# Patient Record
Sex: MALE | Race: WHITE | NOT HISPANIC OR LATINO | URBAN - METROPOLITAN AREA
[De-identification: names, ages, dates, MRNs, and addresses within clinical notes are randomized per-mention and may not be internally consistent; named-entity substitution may affect disease eponyms.]

---

## 2017-08-08 ENCOUNTER — RECORDS - HEALTHEAST (OUTPATIENT)
Dept: LAB | Facility: CLINIC | Age: 57
End: 2017-08-08

## 2017-08-08 LAB
CHOLEST SERPL-MCNC: 193 MG/DL
FASTING STATUS PATIENT QL REPORTED: NO
HDLC SERPL-MCNC: 82 MG/DL
LDLC SERPL CALC-MCNC: 101 MG/DL
PSA SERPL-MCNC: 0.5 NG/ML (ref 0–3.5)
TRIGL SERPL-MCNC: 50 MG/DL

## 2017-11-28 ENCOUNTER — OFFICE VISIT - HEALTHEAST (OUTPATIENT)
Dept: ALLERGY | Facility: CLINIC | Age: 57
End: 2017-11-28

## 2017-11-28 DIAGNOSIS — J30.9 CHRONIC ALLERGIC RHINITIS, UNSPECIFIED SEASONALITY, UNSPECIFIED TRIGGER: ICD-10-CM

## 2017-11-28 ASSESSMENT — MIFFLIN-ST. JEOR: SCORE: 1494.85

## 2018-03-26 ENCOUNTER — COMMUNICATION - HEALTHEAST (OUTPATIENT)
Dept: ALLERGY | Facility: CLINIC | Age: 58
End: 2018-03-26

## 2018-03-26 DIAGNOSIS — J30.9 CHRONIC ALLERGIC RHINITIS, UNSPECIFIED SEASONALITY, UNSPECIFIED TRIGGER: ICD-10-CM

## 2018-07-22 ENCOUNTER — OFFICE VISIT (OUTPATIENT)
Dept: URGENT CARE | Facility: URGENT CARE | Age: 58
End: 2018-07-22
Payer: COMMERCIAL

## 2018-07-22 VITALS
DIASTOLIC BLOOD PRESSURE: 70 MMHG | HEART RATE: 55 BPM | OXYGEN SATURATION: 97 % | TEMPERATURE: 97 F | SYSTOLIC BLOOD PRESSURE: 130 MMHG | WEIGHT: 155 LBS

## 2018-07-22 DIAGNOSIS — S61.011A LACERATION OF RIGHT THUMB WITHOUT FOREIGN BODY WITHOUT DAMAGE TO NAIL, INITIAL ENCOUNTER: Primary | ICD-10-CM

## 2018-07-22 PROCEDURE — 99203 OFFICE O/P NEW LOW 30 MIN: CPT | Performed by: FAMILY MEDICINE

## 2018-07-22 NOTE — PROGRESS NOTES
SUBJECTIVE:     Chief Complaint   Patient presents with     Urgent Care     Laceration     Pt states cut right thumb with hedge clippers today.      Rambo Dukes is a 58 year old right-handed male who presents to the clinic with a laceration on the right thumb (proximal phalanx area) sustained at around 4:30 pm today  This is a non-work related and accidental injury.    Mechanism of injury: A hedge clipper accidentally slipped and cut the right thumb.  Patient's wife is a nurse, and she applied direct pressure onto the wound.  There is still some oozing of blood from the wound.  No problems moving the thumb.  No numbness at the right thumb.     Associated symptoms: Denies numbness, weakness, or loss of function    EXAM:   The patient appears today in alert,no apparent distress distress  VITALS: /70 (BP Location: Left arm, Patient Position: Chair, Cuff Size: Adult Regular)  Pulse 55  Temp 97  F (36.1  C) (Tympanic)  Wt 155 lb (70.3 kg)  SpO2 97%    Size of laceration: 2 centimeters  Characteristics of the laceration: bleeding- mild, flap type and superficial without any dehiscence of the wound edges.    Tendon function intact: yes  Sensation to light touch intact: yes  Pulses intact: not asked  Picture included in patient's chart: no    Assessment:  Superficial Right thumb laceration.  No dehiscence.  As a result, no suture repair will be needed.      PLAN:  Gel foam was applied to the wound and the thumb was wrapped with Coban for 20 minutes.  There was no more bleeding. Patient was discharged.        Gerber Garcia MD

## 2018-07-22 NOTE — MR AVS SNAPSHOT
After Visit Summary   7/22/2018    Rambo Dukes    MRN: [id number]           Patient Information     Date Of Birth          1960        Visit Information        Provider Department      7/22/2018 5:20 PM Gerber Garcia MD [facility]        Today's Diagnoses     Laceration of right thumb without foreign body without damage to nail, initial encounter    -  1       Follow-ups after your visit        Who to contact     If you have questions or need follow up information about today's clinic visit or your schedule please contact [facility] directly at [phone].  Normal or non-critical lab and imaging results will be communicated to you by MyChart, letter or phone within 4 business days after the clinic has received the results. If you do not hear from us within 7 days, please contact the clinic through MyChart or phone. If you have a critical or abnormal lab result, we will notify you by phone as soon as possible.  Submit refill requests through [employer] or call your pharmacy and they will forward the refill request to us. Please allow 3 business days for your refill to be completed.          Additional Information About Your Visit        Care EveryWhere ID     This is your Care EveryWhere ID. This could be used by other organizations to access your [facility] medical records  [id number]        Your Vitals Were     Pulse Temperature Pulse Oximetry             55 97  F (36.1  C) (Tympanic) 97%          Blood Pressure from Last 3 Encounters:   07/22/18 130/70    Weight from Last 3 Encounters:   07/22/18 155 lb (70.3 kg)              Today, you had the following     No orders found for display       Primary Care Provider Fax #    Provider Not In System [phone]                Equal Access to Services     SLICK GARSIA : Mac Morrow, roberto luravinderadaha, qaybta kaaledgar chacko . So [facility]  [phone].    ATENCIÓN: Si habla mildred, tiene a johnson disposición servicios gratuitos de asistencia lingüística. Ramin al [phone].    We comply with applicable federal civil rights laws and [place] laws. We do not discriminate on the basis of race, color, national origin, age, disability, sex, sexual orientation, or gender identity.            Thank you!     Thank you for choosing [facility]  for your care. Our goal is always to provide you with excellent care. Hearing back from our patients is one way we can continue to improve our services. Please take a few minutes to complete the written survey that you may receive in the mail after your visit with us. Thank you!             Your Updated Medication List - Protect others around you: Learn how to safely use, store and throw away your medicines at www.disposemymeds.org.          This list is accurate as of 7/22/18  9:32 PM.  Always use your most recent med list.                   Brand Name Dispense Instructions for use Diagnosis    GLUCOSAMINE SULFATE PO

## 2018-08-18 ENCOUNTER — COMMUNICATION - HEALTHEAST (OUTPATIENT)
Dept: ALLERGY | Facility: CLINIC | Age: 58
End: 2018-08-18

## 2018-08-18 DIAGNOSIS — J30.9 CHRONIC ALLERGIC RHINITIS, UNSPECIFIED SEASONALITY, UNSPECIFIED TRIGGER: ICD-10-CM

## 2018-11-19 ENCOUNTER — COMMUNICATION - HEALTHEAST (OUTPATIENT)
Dept: ALLERGY | Facility: CLINIC | Age: 58
End: 2018-11-19

## 2018-11-19 DIAGNOSIS — J30.2 SEASONAL ALLERGIC RHINITIS, UNSPECIFIED TRIGGER: ICD-10-CM

## 2019-01-18 ENCOUNTER — COMMUNICATION - HEALTHEAST (OUTPATIENT)
Dept: ALLERGY | Facility: CLINIC | Age: 59
End: 2019-01-18

## 2019-01-18 DIAGNOSIS — J30.2 SEASONAL ALLERGIC RHINITIS, UNSPECIFIED TRIGGER: ICD-10-CM

## 2019-04-02 ENCOUNTER — RECORDS - HEALTHEAST (OUTPATIENT)
Dept: LAB | Facility: CLINIC | Age: 59
End: 2019-04-02

## 2019-04-02 LAB
ALBUMIN SERPL-MCNC: 4.1 G/DL (ref 3.5–5)
ALP SERPL-CCNC: 66 U/L (ref 45–120)
ALT SERPL W P-5'-P-CCNC: 33 U/L (ref 0–45)
ANION GAP SERPL CALCULATED.3IONS-SCNC: 11 MMOL/L (ref 5–18)
AST SERPL W P-5'-P-CCNC: 39 U/L (ref 0–40)
BILIRUB SERPL-MCNC: 0.3 MG/DL (ref 0–1)
BUN SERPL-MCNC: 25 MG/DL (ref 8–22)
CALCIUM SERPL-MCNC: 10 MG/DL (ref 8.5–10.5)
CHLORIDE BLD-SCNC: 104 MMOL/L (ref 98–107)
CHOLEST SERPL-MCNC: 180 MG/DL
CO2 SERPL-SCNC: 24 MMOL/L (ref 22–31)
CREAT SERPL-MCNC: 0.93 MG/DL (ref 0.7–1.3)
FASTING STATUS PATIENT QL REPORTED: NORMAL
GFR SERPL CREATININE-BSD FRML MDRD: >60 ML/MIN/1.73M2
GLUCOSE BLD-MCNC: 91 MG/DL (ref 70–125)
HDLC SERPL-MCNC: 88 MG/DL
LDLC SERPL CALC-MCNC: 83 MG/DL
POTASSIUM BLD-SCNC: 4 MMOL/L (ref 3.5–5)
PROT SERPL-MCNC: 6.6 G/DL (ref 6–8)
PSA SERPL-MCNC: 0.5 NG/ML (ref 0–3.5)
SODIUM SERPL-SCNC: 139 MMOL/L (ref 136–145)
TRIGL SERPL-MCNC: 44 MG/DL

## 2020-01-03 ENCOUNTER — AMBULATORY - HEALTHEAST (OUTPATIENT)
Dept: SURGERY | Facility: CLINIC | Age: 60
End: 2020-01-03

## 2020-01-03 DIAGNOSIS — L72.3 SEBACEOUS CYST: ICD-10-CM

## 2020-10-13 ENCOUNTER — RECORDS - HEALTHEAST (OUTPATIENT)
Dept: LAB | Facility: CLINIC | Age: 60
End: 2020-10-13

## 2020-10-13 LAB
ALBUMIN SERPL-MCNC: 4.3 G/DL (ref 3.5–5)
ALP SERPL-CCNC: 68 U/L (ref 45–120)
ALT SERPL W P-5'-P-CCNC: 24 U/L (ref 0–45)
ANION GAP SERPL CALCULATED.3IONS-SCNC: 12 MMOL/L (ref 5–18)
AST SERPL W P-5'-P-CCNC: 31 U/L (ref 0–40)
BILIRUB SERPL-MCNC: 0.4 MG/DL (ref 0–1)
BUN SERPL-MCNC: 27 MG/DL (ref 8–22)
CALCIUM SERPL-MCNC: 9.2 MG/DL (ref 8.5–10.5)
CHLORIDE BLD-SCNC: 103 MMOL/L (ref 98–107)
CHOLEST SERPL-MCNC: 189 MG/DL
CO2 SERPL-SCNC: 26 MMOL/L (ref 22–31)
CREAT SERPL-MCNC: 1.03 MG/DL (ref 0.7–1.3)
FASTING STATUS PATIENT QL REPORTED: NORMAL
GFR SERPL CREATININE-BSD FRML MDRD: >60 ML/MIN/1.73M2
GLUCOSE BLD-MCNC: 90 MG/DL (ref 70–125)
HDLC SERPL-MCNC: 83 MG/DL
LDLC SERPL CALC-MCNC: 95 MG/DL
POTASSIUM BLD-SCNC: 4.1 MMOL/L (ref 3.5–5)
PROT SERPL-MCNC: 6.6 G/DL (ref 6–8)
SODIUM SERPL-SCNC: 141 MMOL/L (ref 136–145)
TRIGL SERPL-MCNC: 53 MG/DL

## 2021-05-28 ENCOUNTER — RECORDS - HEALTHEAST (OUTPATIENT)
Dept: ADMINISTRATIVE | Facility: CLINIC | Age: 61
End: 2021-05-28

## 2021-05-31 VITALS — WEIGHT: 166 LBS | BODY MASS INDEX: 27.66 KG/M2 | HEIGHT: 65 IN

## 2021-06-14 NOTE — PROGRESS NOTES
"Chief complaint: Nasal allergies    History of present illness: This is a pleasant 57-year-old woman who is here today for her nasal allergies.  He states symptoms occur throughout the year but worsened in the fall.  He has a runny nose, congestion and drainage.  He states is difficult to lie flat due to the drainage.  He states his sense of smell is intact.  He has tried some Flonase which helped.  He uses it intermittently.  He believes Zyrtec also helps.  He has no history of cough, wheeze or shortness of breath.  No history of asthma or eczema.  He states he does get headaches from the drainage.  Symptoms worsen when he cuts the grass or is outdoors for long period of time.  Occasionally he will have itchy eyes.    Past medical history: Otherwise unremarkable    Social history: He works as a chiropractor, lives in a home that was built in 1929, no visible mold, no carpeting, symptoms are worse at home, he does have a cat, non-smoking environment, no central air    Family history: Children and father with allergies    Review of Systems performed as above and the remainder is negative.      Current Outpatient Prescriptions:      aspirin 81 mg chewable tablet, Chew 81 mg daily., Disp: , Rfl:     No Known Allergies    /80  Pulse 77  Resp 18  Ht 5' 5\" (1.651 m)  Wt 166 lb (75.3 kg)  BMI 27.62 kg/m2        Gen: Pleasant male not in acute distress  HEENT: Eyes no erythema of the bulbar or palpebral conjunctiva, no edema. Ears: TMs well visualized, no effusions. Nose: No congestion, mucosa normal. Mouth: Throat clear, no lip or tongue edema.   Cardiac: Regular rate and rhythm, no murmurs, rubs or gallops  Respiratory: Clear to auscultation bilaterally, no adventitious breath sounds  Lymph: No supraclavicular or cervical lymphadenopathy  Skin: No rashes or lesions  Psych: Alert and oriented times 3    Last Percutaneous Allergy Test Results  Trees  Ashutosh, White  1:20 H  (W/F in mm): 0 (11/28/17 1420)  Birch Mix " 1:20 H (W/F in mm): 0 (11/28/17 1420)  Robertson, Common 1:20 H (W/F in mm): 0 (11/28/17 1420)  Elm, American 1:20 H (W/F in mm): 0 (11/28/17 1420)  Whiterocks, Shagbark 1:20 H (W/F in mm): 0 (11/28/17 1420)  Maple, Hard/Sugar 1:20 H (W/F in mm): 0 (11/28/17 1420)  [place] Mix 1:20 H (W/F in mm): 0 (11/28/17 1420)  [place], Red 1:20 H (W/F in mm): 0 (11/28/17 1420)  Steedman, American 1:20 H (W/F in mm): 0 (11/28/17 1420)  [place] Tree 1:20 H (W/F in mm): 0 (11/28/17 1420)  Dust Mites  D. Pteronyssinus Mite 30,000 AU/ML H (W/F in mm): 7/10 (11/28/17 1420)  D. Farinae Mite 30,000 AU/ML H (W/F in mm: 7/10 (11/28/17 1420)  Grasses  Grass Mix #4 10,000 BAU/ML H: 0 (11/28/17 1420)  Balta Grass 1:20 H (W/F in mm): 0 (11/28/17 1420)  Cockroach  Cockroach Mix 1:10 H (W/F in mm): 0 (11/28/17 1420)  Molds/Fungi  Alternaria Tenuis 1:10 H (W/F in mm): 0 (11/28/17 1420)  Aspergillus Fumigatus 1:10 H (W/F in mm): 0 (11/28/17 1420)  Homodendrum Cladosporioides 1:10 H (W/F in mm): 0 (11/28/17 1420)  Penicillin Notatum 1:10 H (W/F in mm): 0 (11/28/17 1420)  Epicoccum 1:10 H (W/F in mm): 0 (11/28/17 1420)  Weeds  Ragweed, Short 1:20 H (W/F in mm): 0 (11/28/17 1420)  Dock, Sorrel 1:20 H (W/F in mm): 0 (11/28/17 1420)  Lamb's Quarter 1:20 H (W/F in mm): 0 (11/28/17 1420)  Pigweed, Rough Red Root 1:20 H  (W/F in mm): 0 (11/28/17 1420)  Plantain, English 1:20 H  (W/F in mm): 3/30 (11/28/17 1420)  Sagebrush, Mugwort 1:20 H  (W/F in mm): 0 (11/28/17 1420)  Animal  Cat 10,000 BAU/ML H (W/F in mm): 3/10 (11/28/17 1420)  Controls  Device Type: QUINTIP (11/28/17 1420)  Neg. control: 50% Glycerine/Saline H (W/F in mm): 0 (11/28/17 1420)  Pos. control: Histamine 6mg/ML (W/F in mms): 5/30 (11/28/17 1420)          Impression report and plan:    1.  Allergic rhinitis    I would recommend fluticasone nasal spray 1 spray each nostril twice daily.  I went over environmental control regarding the cat and dust mite.  He will notify me if he is still doing  poorly after 1 month.  I also recommended regular nasal rinses.  Follow as needed.

## 2021-06-16 PROBLEM — J30.9 CHRONIC ALLERGIC RHINITIS: Status: ACTIVE | Noted: 2017-11-28

## 2023-12-15 ENCOUNTER — NURSE TRIAGE (OUTPATIENT)
Dept: NURSING | Facility: CLINIC | Age: 63
End: 2023-12-15
Payer: COMMERCIAL

## 2023-12-15 NOTE — TELEPHONE ENCOUNTER
Tongue pain with whiteness on top and redness around.   Exposure to COVID last week by co-worker- hasn't has contact with her in over a week, tested negative today.   Noticed white couple days ago.   Feeling well otherwise.   Color of tongue changes some- is able to brush off but comes back.     Did use alcohol mouth wash, helps but immediately comes back.   Used lozenges with zinc, more than was recommended- was concerned about the COVID.     Patient declines to be seen currently. Will contact PCP if would like further evaluation.       Reason for Disposition   [1] White patches that stick to tongue or inner cheek AND [2] can be wiped off    Additional Information   Negative: SEVERE difficulty breathing (e.g., struggling for each breath, speaks in single words, stridor)   Negative: Sounds like a life-threatening emergency to the triager   Negative: Followed a tooth (or teeth) injury   Negative: Throat is painful   Negative: Followed a mouth injury   Negative: Canker sore suspected (i.e., aphthous ulcer) in the mouth   Negative: Cold sore suspected (i.e., fever blister sore on the outer lip)   Negative: Tooth is painful or swelling around a tooth   Negative: [1] Drooling or spitting out saliva (because can't swallow) AND [2] new-onset   Negative: Electrical burn of mouth   Negative: Chemical burn of mouth   Negative: Tongue is very swollen and tender   Negative: [1] Difficulty breathing AND [2] not severe   Negative: [1] Face is swollen AND [2] fever   Negative: [1] Drinking very little AND [2] dehydration suspected (e.g., no urine > 12 hours, very dry mouth, very lightheaded)   Negative: Patient sounds very sick or weak to the triager   Negative: [1] SEVERE mouth pain (e.g., excruciating) AND [2] not improved after 2 hours of pain medicine   Negative: [1] Bloody crusts on lips or sores in mouth AND [2] rash anywhere elese on body (back, chest, face, palms, soles)   Negative: Face is very swollen   Negative: Large  lymph node (> 1 inch or 2.5 cm) under the jaw   Negative: [1] Face is swollen AND [2] no fever   Negative: Receiving chemotherapy or radiation therapy   Negative: [1] MILD-MODERATE mouth pain AND [2] present > 3 days    Protocols used: Mouth Pain-A-AH

## 2024-09-10 ENCOUNTER — LAB REQUISITION (OUTPATIENT)
Dept: LAB | Facility: CLINIC | Age: 64
End: 2024-09-10

## 2024-09-10 DIAGNOSIS — Z13.6 ENCOUNTER FOR SCREENING FOR CARDIOVASCULAR DISORDERS: ICD-10-CM

## 2024-09-10 DIAGNOSIS — Z12.5 ENCOUNTER FOR SCREENING FOR MALIGNANT NEOPLASM OF PROSTATE: ICD-10-CM

## 2024-09-10 DIAGNOSIS — R53.83 OTHER FATIGUE: ICD-10-CM

## 2024-09-10 DIAGNOSIS — Z13.1 ENCOUNTER FOR SCREENING FOR DIABETES MELLITUS: ICD-10-CM

## 2024-09-10 PROCEDURE — 80053 COMPREHEN METABOLIC PANEL: CPT | Performed by: FAMILY MEDICINE

## 2024-09-10 PROCEDURE — 84443 ASSAY THYROID STIM HORMONE: CPT | Performed by: FAMILY MEDICINE

## 2024-09-10 PROCEDURE — 80061 LIPID PANEL: CPT | Performed by: FAMILY MEDICINE

## 2024-09-10 PROCEDURE — G0103 PSA SCREENING: HCPCS | Performed by: FAMILY MEDICINE

## 2024-09-12 LAB
ALBUMIN SERPL BCG-MCNC: 4.4 G/DL (ref 3.5–5.2)
ALP SERPL-CCNC: 73 U/L (ref 40–150)
ALT SERPL W P-5'-P-CCNC: 35 U/L (ref 0–70)
ANION GAP SERPL CALCULATED.3IONS-SCNC: 11 MMOL/L (ref 7–15)
AST SERPL W P-5'-P-CCNC: 37 U/L (ref 0–45)
BILIRUB SERPL-MCNC: 0.3 MG/DL
BUN SERPL-MCNC: 27.4 MG/DL (ref 8–23)
CALCIUM SERPL-MCNC: 9 MG/DL (ref 8.8–10.4)
CHLORIDE SERPL-SCNC: 101 MMOL/L (ref 98–107)
CHOLEST SERPL-MCNC: 216 MG/DL
CREAT SERPL-MCNC: 0.99 MG/DL (ref 0.67–1.17)
EGFRCR SERPLBLD CKD-EPI 2021: 86 ML/MIN/1.73M2
FASTING STATUS PATIENT QL REPORTED: ABNORMAL
FASTING STATUS PATIENT QL REPORTED: ABNORMAL
GLUCOSE SERPL-MCNC: 88 MG/DL (ref 70–99)
HCO3 SERPL-SCNC: 25 MMOL/L (ref 22–29)
HDLC SERPL-MCNC: 87 MG/DL
LDLC SERPL CALC-MCNC: 116 MG/DL
NONHDLC SERPL-MCNC: 129 MG/DL
POTASSIUM SERPL-SCNC: 4.1 MMOL/L (ref 3.4–5.3)
PROT SERPL-MCNC: 6.7 G/DL (ref 6.4–8.3)
PSA SERPL DL<=0.01 NG/ML-MCNC: 0.52 NG/ML (ref 0–4.5)
SODIUM SERPL-SCNC: 137 MMOL/L (ref 135–145)
TRIGL SERPL-MCNC: 64 MG/DL
TSH SERPL DL<=0.005 MIU/L-ACNC: 1.84 UIU/ML (ref 0.3–4.2)